# Patient Record
Sex: MALE | Race: WHITE | NOT HISPANIC OR LATINO | Employment: UNEMPLOYED | URBAN - METROPOLITAN AREA
[De-identification: names, ages, dates, MRNs, and addresses within clinical notes are randomized per-mention and may not be internally consistent; named-entity substitution may affect disease eponyms.]

---

## 2020-12-17 ENCOUNTER — HOSPITAL ENCOUNTER (EMERGENCY)
Facility: OTHER | Age: 30
Discharge: HOME | End: 2020-12-17
Attending: EMERGENCY MEDICINE

## 2020-12-17 VITALS
SYSTOLIC BLOOD PRESSURE: 126 MMHG | DIASTOLIC BLOOD PRESSURE: 74 MMHG | HEIGHT: 70 IN | TEMPERATURE: 99 F | BODY MASS INDEX: 24.34 KG/M2 | WEIGHT: 170 LBS | RESPIRATION RATE: 18 BRPM | HEART RATE: 63 BPM | OXYGEN SATURATION: 98 %

## 2020-12-17 DIAGNOSIS — N48.89 PENILE EDEMA: ICD-10-CM

## 2020-12-17 DIAGNOSIS — N30.01 ACUTE CYSTITIS WITH HEMATURIA: Primary | ICD-10-CM

## 2020-12-17 DIAGNOSIS — R10.32 LEFT GROIN PAIN: ICD-10-CM

## 2020-12-17 LAB
ALBUMIN SERPL BCP-MCNC: 4.2 G/DL (ref 3.5–5.2)
ALP SERPL-CCNC: 63 U/L (ref 55–135)
ALT SERPL W/O P-5'-P-CCNC: 20 U/L (ref 10–44)
ANION GAP SERPL CALC-SCNC: 10 MMOL/L (ref 8–16)
AST SERPL-CCNC: 29 U/L (ref 10–40)
BACTERIA #/AREA URNS HPF: ABNORMAL /HPF
BASOPHILS # BLD AUTO: 0.05 K/UL (ref 0–0.2)
BASOPHILS NFR BLD: 0.5 % (ref 0–1.9)
BILIRUB SERPL-MCNC: 0.9 MG/DL (ref 0.1–1)
BILIRUB UR QL STRIP: NEGATIVE
BUN SERPL-MCNC: 11 MG/DL (ref 6–20)
CALCIUM SERPL-MCNC: 9.5 MG/DL (ref 8.7–10.5)
CHLORIDE SERPL-SCNC: 103 MMOL/L (ref 95–110)
CLARITY UR: CLEAR
CO2 SERPL-SCNC: 24 MMOL/L (ref 23–29)
COLOR UR: YELLOW
CREAT SERPL-MCNC: 1.1 MG/DL (ref 0.5–1.4)
DIFFERENTIAL METHOD: ABNORMAL
EOSINOPHIL # BLD AUTO: 0 K/UL (ref 0–0.5)
EOSINOPHIL NFR BLD: 0.3 % (ref 0–8)
ERYTHROCYTE [DISTWIDTH] IN BLOOD BY AUTOMATED COUNT: 11.6 % (ref 11.5–14.5)
EST. GFR  (AFRICAN AMERICAN): >60 ML/MIN/1.73 M^2
EST. GFR  (NON AFRICAN AMERICAN): >60 ML/MIN/1.73 M^2
GLUCOSE SERPL-MCNC: 97 MG/DL (ref 70–110)
GLUCOSE UR QL STRIP: NEGATIVE
HCT VFR BLD AUTO: 40.2 % (ref 40–54)
HCV AB SERPL QL IA: NEGATIVE
HGB BLD-MCNC: 14 G/DL (ref 14–18)
HGB UR QL STRIP: ABNORMAL
HIV 1+2 AB+HIV1 P24 AG SERPL QL IA: NEGATIVE
IMM GRANULOCYTES # BLD AUTO: 0.04 K/UL (ref 0–0.04)
IMM GRANULOCYTES NFR BLD AUTO: 0.4 % (ref 0–0.5)
KETONES UR QL STRIP: NEGATIVE
LEUKOCYTE ESTERASE UR QL STRIP: ABNORMAL
LYMPHOCYTES # BLD AUTO: 1.4 K/UL (ref 1–4.8)
LYMPHOCYTES NFR BLD: 12.6 % (ref 18–48)
MCH RBC QN AUTO: 28.2 PG (ref 27–31)
MCHC RBC AUTO-ENTMCNC: 34.8 G/DL (ref 32–36)
MCV RBC AUTO: 81 FL (ref 82–98)
MICROSCOPIC COMMENT: ABNORMAL
MONOCYTES # BLD AUTO: 0.9 K/UL (ref 0.3–1)
MONOCYTES NFR BLD: 8.5 % (ref 4–15)
NEUTROPHILS # BLD AUTO: 8.4 K/UL (ref 1.8–7.7)
NEUTROPHILS NFR BLD: 77.7 % (ref 38–73)
NITRITE UR QL STRIP: NEGATIVE
NRBC BLD-RTO: 0 /100 WBC
PH UR STRIP: 6 [PH] (ref 5–8)
PLATELET # BLD AUTO: 174 K/UL (ref 150–350)
PMV BLD AUTO: 12 FL (ref 9.2–12.9)
POTASSIUM SERPL-SCNC: 3.9 MMOL/L (ref 3.5–5.1)
PROT SERPL-MCNC: 7.4 G/DL (ref 6–8.4)
PROT UR QL STRIP: NEGATIVE
RBC # BLD AUTO: 4.96 M/UL (ref 4.6–6.2)
RBC #/AREA URNS HPF: 1 /HPF (ref 0–4)
SODIUM SERPL-SCNC: 137 MMOL/L (ref 136–145)
SP GR UR STRIP: 1.01 (ref 1–1.03)
SQUAMOUS #/AREA URNS HPF: 3 /HPF
URN SPEC COLLECT METH UR: ABNORMAL
UROBILINOGEN UR STRIP-ACNC: NEGATIVE EU/DL
WBC # BLD AUTO: 10.78 K/UL (ref 3.9–12.7)
WBC #/AREA URNS HPF: 10 /HPF (ref 0–5)
WBC CLUMPS URNS QL MICRO: ABNORMAL

## 2020-12-17 PROCEDURE — 25000003 PHARM REV CODE 250: Performed by: EMERGENCY MEDICINE

## 2020-12-17 PROCEDURE — 99285 EMERGENCY DEPT VISIT HI MDM: CPT | Mod: 25

## 2020-12-17 PROCEDURE — 86703 HIV-1/HIV-2 1 RESULT ANTBDY: CPT

## 2020-12-17 PROCEDURE — 63600175 PHARM REV CODE 636 W HCPCS: Performed by: EMERGENCY MEDICINE

## 2020-12-17 PROCEDURE — 87086 URINE CULTURE/COLONY COUNT: CPT

## 2020-12-17 PROCEDURE — 96361 HYDRATE IV INFUSION ADD-ON: CPT

## 2020-12-17 PROCEDURE — 80053 COMPREHEN METABOLIC PANEL: CPT

## 2020-12-17 PROCEDURE — 85025 COMPLETE CBC W/AUTO DIFF WBC: CPT

## 2020-12-17 PROCEDURE — 81000 URINALYSIS NONAUTO W/SCOPE: CPT

## 2020-12-17 PROCEDURE — 86803 HEPATITIS C AB TEST: CPT

## 2020-12-17 PROCEDURE — 96374 THER/PROPH/DIAG INJ IV PUSH: CPT

## 2020-12-17 PROCEDURE — 25500020 PHARM REV CODE 255: Performed by: EMERGENCY MEDICINE

## 2020-12-17 RX ORDER — ACETAMINOPHEN 500 MG
1000 TABLET ORAL
Status: COMPLETED | OUTPATIENT
Start: 2020-12-17 | End: 2020-12-17

## 2020-12-17 RX ORDER — OXYCODONE AND ACETAMINOPHEN 5; 325 MG/1; MG/1
1 TABLET ORAL EVERY 6 HOURS PRN
Qty: 10 TABLET | Refills: 0 | Status: SHIPPED | OUTPATIENT
Start: 2020-12-17 | End: 2023-06-08

## 2020-12-17 RX ORDER — SULFAMETHOXAZOLE AND TRIMETHOPRIM 800; 160 MG/1; MG/1
1 TABLET ORAL 2 TIMES DAILY
Qty: 20 TABLET | Refills: 0 | Status: SHIPPED | OUTPATIENT
Start: 2020-12-17 | End: 2020-12-27

## 2020-12-17 RX ORDER — MORPHINE SULFATE 10 MG/ML
2 INJECTION INTRAMUSCULAR; INTRAVENOUS; SUBCUTANEOUS
Status: COMPLETED | OUTPATIENT
Start: 2020-12-17 | End: 2020-12-17

## 2020-12-17 RX ADMIN — SODIUM CHLORIDE 1000 ML: 0.9 INJECTION, SOLUTION INTRAVENOUS at 09:12

## 2020-12-17 RX ADMIN — IOHEXOL 75 ML: 350 INJECTION, SOLUTION INTRAVENOUS at 10:12

## 2020-12-17 RX ADMIN — ACETAMINOPHEN 1000 MG: 500 TABLET, FILM COATED ORAL at 09:12

## 2020-12-17 RX ADMIN — MORPHINE SULFATE 2 MG: 10 INJECTION INTRAVENOUS at 09:12

## 2020-12-18 NOTE — FIRST PROVIDER EVALUATION
" Emergency Department TeleTriage Encounter Note      CHIEF COMPLAINT    Chief Complaint   Patient presents with    Groin Pain     reports left sided groin pain and a "lump" x2 days with penis swelling today. Denies testicle swelling and discharge        VITAL SIGNS   Initial Vitals [12/17/20 1924]   BP Pulse Resp Temp SpO2   135/76 (!) 115 18 100 °F (37.8 °C) 99 %      MAP       --            ALLERGIES    Review of patient's allergies indicates:  No Known Allergies    PROVIDER TRIAGE NOTE  Patient reports swelling, tenderness and redness to the groin area.  He denies any urinary symptoms.  He denies penile discharge.  He is not significantly concerned for sexually transmitted infections.  He denies any testicular pain.      ORDERS  Labs Reviewed   HIV 1 / 2 ANTIBODY   HEPATITIS C ANTIBODY       ED Orders (720h ago, onward)    Start Ordered     Status Ordering Provider    12/17/20 1926 12/17/20 1926  HIV 1/2 Ag/Ab (4th Gen)  STAT  Collect    Ordered WILLY REDDING LMadelin II    12/17/20 1926 12/17/20 1926  Hepatitis C antibody  STAT  Collect    Ordered WILLY REDDING LMadelin II            Virtual Visit Note: The provider triage portion of this emergency department evaluation and documentation was performed via [employer], a HIPAA-compliant telemedicine application, in concert with a tele-presenter in the room. A face to face patient evaluation with one of my colleagues will occur once the patient is placed in an emergency department room.      DISCLAIMER: This note was prepared with [employer]*Bundle voice recognition transcription software. Garbled syntax, mangled pronouns, and other bizarre constructions may be attributed to that software system.  "

## 2020-12-18 NOTE — ED TRIAGE NOTES
"Pt reports to ED with c/o groin swelling x 1 day. Pt states that he had sex with his girlfriend and noticed swelling in his penis as well as a "lump" in L groin. Some swelling noted to head of penis, denies any pain but states that the "lump in his groin" is tender.  Pt denies any urinary s/s.  "

## 2020-12-18 NOTE — ED PROVIDER NOTES
"Encounter Date: 12/17/2020    SCRIBE #1 NOTE: I, Robles Stoddard, am scribing for, and in the presence of, Dr. Powers.       History     Chief Complaint   Patient presents with    Groin Pain     reports left sided groin pain and a "lump" x2 days with penis swelling today. Denies testicle swelling and discharge      Time seen by provider: 9:09 PM    This is a 30 y.o. male who presents with complaint of left sided groin pain that began two days ago. He is also experiencing penile swelling and intermittent fever. The patient is  and sexually active. He last had intercourse on 12/15/2020, but denies any pain with intercourse. He denies testicular pain, testicular swelling, or penile discharge.     The history is provided by the patient.     Review of patient's allergies indicates:  No Known Allergies  History reviewed. No pertinent past medical history.  Past Surgical History:   Procedure Laterality Date    KNEE SURGERY       History reviewed. No pertinent family history.  Social History     Tobacco Use    Smoking status: Former Smoker     Types: Vaping w/o nicotine    Smokeless tobacco: Never Used    Tobacco comment: occ   Substance Use Topics    Alcohol use: No    Drug use: Not Currently     Review of Systems   Constitutional: Negative for chills and fever.   HENT: Negative for congestion and sore throat.    Eyes: Negative for photophobia and redness.   Respiratory: Negative for cough and shortness of breath.    Cardiovascular: Negative for chest pain.   Gastrointestinal: Negative for abdominal pain, nausea and vomiting.   Genitourinary: Positive for penile swelling. Negative for dysuria.        Positive for left sided groin pain.   Musculoskeletal: Negative for back pain.   Skin: Negative for rash.   Neurological: Negative for weakness, light-headedness and headaches.   Psychiatric/Behavioral: Negative for confusion.       Physical Exam     Initial Vitals [12/17/20 1924]   BP Pulse Resp Temp SpO2 "   135/76 (!) 115 18 100 °F (37.8 °C) 99 %      MAP       --         Physical Exam    Nursing note and vitals reviewed.  Constitutional: He appears well-developed and well-nourished. He is not diaphoretic. No distress.   HENT:   Head: Normocephalic and atraumatic.   Mouth/Throat: Oropharynx is clear and moist.   Moist mucus membranes. TMs clear and intact bilaterally.    Eyes: Conjunctivae and EOM are normal. Pupils are equal, round, and reactive to light.   Conjunctivae pink, clear, and intact.    Neck: Normal range of motion. Neck supple.   No cervical lymphadenopathy.    Cardiovascular: Normal rate, regular rhythm, S1 normal, S2 normal and normal heart sounds. Exam reveals no gallop and no friction rub.    No murmur heard.  Pulmonary/Chest: Breath sounds normal. No respiratory distress. He has no wheezes. He has no rhonchi. He has no rales.   Lungs clear to auscultation bilaterally.    Abdominal: Soft. Bowel sounds are normal. There is no abdominal tenderness. There is no rebound and no guarding.   No audible bruits.    Genitourinary: Circumcised.    Genitourinary Comments: Penis is edematous. No abscess. No scrotal tenderness. No perineal pain, tenderness, or abscess. No palpable hernia. Tenderness to the left groin area with no mass or lymphadenopathy.     Musculoskeletal: Normal range of motion. No tenderness or edema.      Comments: No lower extremity edema.    Lymphadenopathy:     He has no cervical adenopathy.   Neurological: He is alert and oriented to person, place, and time.   Skin: Skin is warm and dry. Capillary refill takes less than 2 seconds. No rash noted. No pallor.   Warm and dry. No skin tenting, rashes, or lesions.     Psychiatric: He has a normal mood and affect. His behavior is normal. Judgment and thought content normal.         ED Course   Procedures  Labs Reviewed   URINALYSIS, REFLEX TO URINE CULTURE - Abnormal; Notable for the following components:       Result Value    Occult Blood UA 1+  (*)     Leukocytes, UA Trace (*)     All other components within normal limits    Narrative:     Specimen Source->Urine   URINALYSIS MICROSCOPIC - Abnormal; Notable for the following components:    WBC, UA 10 (*)     WBC Clumps, UA Few (*)     All other components within normal limits    Narrative:     Specimen Source->Urine   CBC W/ AUTO DIFFERENTIAL - Abnormal; Notable for the following components:    MCV 81 (*)     Gran # (ANC) 8.4 (*)     Gran % 77.7 (*)     Lymph % 12.6 (*)     All other components within normal limits   CULTURE, URINE   CULTURE, URINE   HIV 1 / 2 ANTIBODY   HEPATITIS C ANTIBODY   COMPREHENSIVE METABOLIC PANEL          Imaging Results          CT Abdomen Pelvis With Contrast / no oral contrast (Final result)  Result time 12/17/20 22:43:46    Final result by Jose Manrique MD (12/17/20 22:43:46)                 Impression:      1. Nonspecific penile soft tissue swelling and edema, correlating with reported clinical history.  No rim enhancing abscess seen.  2. Prominent soft tissue attenuation within the right mid abdomen which closely adheres along the inferior hepatic contour.  Appearance is atypical but may relate to conglomeration of nondistended and non-opacified small bowel loops with possible congenital non-rotation.  Future CT follow-up with oral contrast versus upper GI/small bowel follow-through may be obtained if clinically indicated.  3. Otherwise no acute intra-abdominal abnormalities identified.  4. Mild hepatosplenomegaly.      Electronically signed by: Jose Manrique MD  Date:    12/17/2020  Time:    22:43             Narrative:    EXAMINATION:  CT ABDOMEN PELVIS WITH CONTRAST    CLINICAL HISTORY:  Groin pain with fever and penile edema, circumcised;    TECHNIQUE:  Low dose axial images, sagittal and coronal reformations were obtained from the lung bases to the pubic symphysis following the IV administration of 75 mL of Omnipaque 350 .  Oral contrast was not  given.    COMPARISON:  None.    FINDINGS:  The visualized portion of the heart is unremarkable.  The lung bases are clear.    No significant hepatic abnormalities are identified.  Liver is enlarged measuring 20 cm.  Spleen is mildly enlarged measuring 13 cm.  There is no intra-or extrahepatic biliary ductal dilatation.  The gallbladder is unremarkable.  The stomach, pancreas, spleen, and adrenal glands are unremarkable.    Kidneys enhance normally with no evidence of hydronephrosis.  No definite abnormalities are seen along the ureteral courses.  Urinary bladder and prostate are unremarkable.    Appendix is not definitely visualized.  There is nonspecific prominent soft tissue attenuation seen in the right mid abdomen which is felt most likely to represent conglomeration of nondistended and non-opacified right-sided small bowel loops with possible congenital non-rotation.  The adheres closely along the inferior hepatic contour.  No evidence of bowel obstruction.  No free air or free fluid.    Aorta tapers normally.    No acute osseous abnormality identified.    There is mild penile soft tissue swelling and edema.  No focal fluid collections or rim enhancing abscess seen                                 Medical Decision Making:   History:   Old Medical Records: I decided to obtain old medical records.  Clinical Tests:   Lab Tests: Ordered and Reviewed            Scribe Attestation:   Scribe #1: I performed the above scribed service and the documentation accurately describes the services I performed. I attest to the accuracy of the note.    Attending Attestation:           Physician Attestation for Scribe:  Physician Attestation Statement for Scribe #1: I, Dr. Powers, reviewed documentation, as scribed by Robles Stoddard in my presence, and it is both accurate and complete.         Attending ED Notes:   Emergent evaluation a 30-year-old female with complaint of painless penile edema and left-sided nontraumatic groin  pain.  Patient is  and sexually active with 1 partner.  Patient is mildly febrile, nontoxic-appearing stable vital signs.  Patient has no elevation white blood cell count.  H&H is 14 and 40.2.  No acute findings on CMP.  On examination femoral pulses 2+.  No masses or lymphadenopathy.  No hernia appreciated.  No Ana's gangrene.  No testicular pain.  Urinary analysis reveals blood, leukocytes with bacteria and white blood cell clumps.  No acute findings on CT scan except for nonspecific penile soft tissue swelling and edema.  Patient has mild hepatic splenomegaly.  I consulted and discussed patient with Urology on-call, Dr. Loaiza.  No acute intervention at this time.  The patient is extensively counseled on his diagnosis and treatment including all diagnostic, laboratory and physical exam findings.  The patient discharged good condition and directed follow-up with Urology this coming Monday.                    Clinical Impression:     ICD-10-CM ICD-9-CM   1. Acute cystitis with hematuria  N30.01 595.0   2. Left groin pain  R10.32 789.04   3. Penile edema  N48.89 607.83                          ED Disposition Condition    Discharge Good        ED Prescriptions     Medication Sig Dispense Start Date End Date Auth. Provider    sulfamethoxazole-trimethoprim 800-160mg (BACTRIM DS) 800-160 mg Tab Take 1 tablet by mouth 2 (two) times daily. for 10 days 20 tablet 12/17/2020 12/27/2020 Yony Man MD    oxyCODONE-acetaminophen (PERCOCET) 5-325 mg per tablet Take 1 tablet by mouth every 6 (six) hours as needed for Pain. 10 tablet 12/17/2020  Yoyn Man MD        Follow-up Information     Follow up With Specialties Details Why Contact Info Additional Information    [facility], [place], operated by [facility] Urology In 2 days  [address], [address]  [place] [address] [phone] Urology - [facility], 6th Floor Please park in the [address] and use [place] elevators                                        Yony Man MD  12/18/20 0759

## 2020-12-19 LAB — BACTERIA UR CULT: NO GROWTH

## 2022-08-07 ENCOUNTER — NURSE TRIAGE (OUTPATIENT)
Dept: ADMINISTRATIVE | Facility: CLINIC | Age: 32
End: 2022-08-07

## 2022-08-08 NOTE — TELEPHONE ENCOUNTER
Reason for Disposition   Symptoms of FB stuck in esophagus (e.g., continued pain in throat or chest, FB sensation, gagging)    Additional Information   Negative: [1] Choking or struggling to breathe now AND [2] lasts > 60 seconds   Negative: Can't cough, speak, or make any noise now (i.e., stops breathing)   Negative: Has passed out or is limp.   Negative: Bluish (or gray) lips or face now   Negative: Sounds like a life-threatening emergency to the triager   Negative: [1] Recovered from choking AND [2] may have swallowed a FB (foreign body)   Negative: [1] Patient cleared the FB spontaneously BUT [2] continues to have coughing, hoarseness, or wheezing > 30 minutes   Negative: Coughed up blood  (Exception:  blood-streaked sputum and once only)   Negative: [1] Patient cleared the FB spontaneously BUT [2] difficulty swallowing or gagging persist   Negative: Shortness of breath after choking spell    Protocols used: CHOKING - INHALED FOREIGN BODY-A-AH  spouse called re pt with food stuck in airway. Pt feels piece of rice in his airway. no trouble breathing. rec ED. Call back with questions

## 2022-11-02 ENCOUNTER — HOSPITAL ENCOUNTER (EMERGENCY)
Facility: OTHER | Age: 32
Discharge: HOME | End: 2022-11-02
Attending: EMERGENCY MEDICINE
Payer: MEDICAID

## 2022-11-02 VITALS
HEART RATE: 70 BPM | DIASTOLIC BLOOD PRESSURE: 57 MMHG | RESPIRATION RATE: 17 BRPM | WEIGHT: 175 LBS | OXYGEN SATURATION: 98 % | HEIGHT: 70 IN | SYSTOLIC BLOOD PRESSURE: 104 MMHG | BODY MASS INDEX: 25.05 KG/M2 | TEMPERATURE: 99 F

## 2022-11-02 DIAGNOSIS — R10.30 INGUINAL PAIN, UNSPECIFIED LATERALITY: ICD-10-CM

## 2022-11-02 DIAGNOSIS — Q43.3 MALROTATION COLON: ICD-10-CM

## 2022-11-02 DIAGNOSIS — R50.9 FEVER, UNSPECIFIED FEVER CAUSE: Primary | ICD-10-CM

## 2022-11-02 LAB
ALBUMIN SERPL BCP-MCNC: 4.2 G/DL (ref 3.5–5.2)
ALP SERPL-CCNC: 83 U/L (ref 55–135)
ALT SERPL W/O P-5'-P-CCNC: 15 U/L (ref 10–44)
ANION GAP SERPL CALC-SCNC: 9 MMOL/L (ref 8–16)
AST SERPL-CCNC: 18 U/L (ref 10–40)
BASOPHILS # BLD AUTO: 0.04 K/UL (ref 0–0.2)
BASOPHILS NFR BLD: 0.2 % (ref 0–1.9)
BILIRUB SERPL-MCNC: 0.5 MG/DL (ref 0.1–1)
BILIRUB UR QL STRIP: NEGATIVE
BUN SERPL-MCNC: 13 MG/DL (ref 6–20)
CALCIUM SERPL-MCNC: 9.3 MG/DL (ref 8.7–10.5)
CHLORIDE SERPL-SCNC: 106 MMOL/L (ref 95–110)
CLARITY UR: CLEAR
CO2 SERPL-SCNC: 27 MMOL/L (ref 23–29)
COLOR UR: YELLOW
CREAT SERPL-MCNC: 0.9 MG/DL (ref 0.5–1.4)
CTP QC/QA: YES
CTP QC/QA: YES
DIFFERENTIAL METHOD: ABNORMAL
EOSINOPHIL # BLD AUTO: 0.2 K/UL (ref 0–0.5)
EOSINOPHIL NFR BLD: 1 % (ref 0–8)
ERYTHROCYTE [DISTWIDTH] IN BLOOD BY AUTOMATED COUNT: 11.7 % (ref 11.5–14.5)
EST. GFR  (NO RACE VARIABLE): >60 ML/MIN/1.73 M^2
GLUCOSE SERPL-MCNC: 118 MG/DL (ref 70–110)
GLUCOSE UR QL STRIP: NEGATIVE
HCT VFR BLD AUTO: 41.9 % (ref 40–54)
HCV AB SERPL QL IA: NEGATIVE
HGB BLD-MCNC: 14.3 G/DL (ref 14–18)
HGB UR QL STRIP: NEGATIVE
HIV 1+2 AB+HIV1 P24 AG SERPL QL IA: NEGATIVE
IMM GRANULOCYTES # BLD AUTO: 0.05 K/UL (ref 0–0.04)
IMM GRANULOCYTES NFR BLD AUTO: 0.3 % (ref 0–0.5)
KETONES UR QL STRIP: NEGATIVE
LEUKOCYTE ESTERASE UR QL STRIP: NEGATIVE
LYMPHOCYTES # BLD AUTO: 0.8 K/UL (ref 1–4.8)
LYMPHOCYTES NFR BLD: 4.7 % (ref 18–48)
MCH RBC QN AUTO: 27.8 PG (ref 27–31)
MCHC RBC AUTO-ENTMCNC: 34.1 G/DL (ref 32–36)
MCV RBC AUTO: 82 FL (ref 82–98)
MONOCYTES # BLD AUTO: 0.6 K/UL (ref 0.3–1)
MONOCYTES NFR BLD: 3.3 % (ref 4–15)
NEUTROPHILS # BLD AUTO: 15.7 K/UL (ref 1.8–7.7)
NEUTROPHILS NFR BLD: 90.5 % (ref 38–73)
NITRITE UR QL STRIP: NEGATIVE
NRBC BLD-RTO: 0 /100 WBC
PH UR STRIP: 6 [PH] (ref 5–8)
PLATELET # BLD AUTO: 249 K/UL (ref 150–450)
PMV BLD AUTO: 10.9 FL (ref 9.2–12.9)
POC MOLECULAR INFLUENZA A AGN: NEGATIVE
POC MOLECULAR INFLUENZA B AGN: NEGATIVE
POTASSIUM SERPL-SCNC: 4.1 MMOL/L (ref 3.5–5.1)
PROT SERPL-MCNC: 7.4 G/DL (ref 6–8.4)
PROT UR QL STRIP: NEGATIVE
RBC # BLD AUTO: 5.14 M/UL (ref 4.6–6.2)
SARS-COV-2 RDRP RESP QL NAA+PROBE: NEGATIVE
SODIUM SERPL-SCNC: 142 MMOL/L (ref 136–145)
SP GR UR STRIP: 1.02 (ref 1–1.03)
URN SPEC COLLECT METH UR: NORMAL
UROBILINOGEN UR STRIP-ACNC: 1 EU/DL
WBC # BLD AUTO: 17.32 K/UL (ref 3.9–12.7)

## 2022-11-02 PROCEDURE — 85025 COMPLETE CBC W/AUTO DIFF WBC: CPT | Performed by: EMERGENCY MEDICINE

## 2022-11-02 PROCEDURE — 25000003 PHARM REV CODE 250: Performed by: EMERGENCY MEDICINE

## 2022-11-02 PROCEDURE — 86803 HEPATITIS C AB TEST: CPT | Performed by: EMERGENCY MEDICINE

## 2022-11-02 PROCEDURE — 96365 THER/PROPH/DIAG IV INF INIT: CPT

## 2022-11-02 PROCEDURE — 96367 TX/PROPH/DG ADDL SEQ IV INF: CPT

## 2022-11-02 PROCEDURE — 87040 BLOOD CULTURE FOR BACTERIA: CPT | Performed by: EMERGENCY MEDICINE

## 2022-11-02 PROCEDURE — 81003 URINALYSIS AUTO W/O SCOPE: CPT | Performed by: EMERGENCY MEDICINE

## 2022-11-02 PROCEDURE — 87635 SARS-COV-2 COVID-19 AMP PRB: CPT | Performed by: EMERGENCY MEDICINE

## 2022-11-02 PROCEDURE — 99285 EMERGENCY DEPT VISIT HI MDM: CPT | Mod: 25

## 2022-11-02 PROCEDURE — 63600175 PHARM REV CODE 636 W HCPCS: Performed by: EMERGENCY MEDICINE

## 2022-11-02 PROCEDURE — 96375 TX/PRO/DX INJ NEW DRUG ADDON: CPT

## 2022-11-02 PROCEDURE — 87389 HIV-1 AG W/HIV-1&-2 AB AG IA: CPT | Performed by: EMERGENCY MEDICINE

## 2022-11-02 PROCEDURE — 25500020 PHARM REV CODE 255: Performed by: EMERGENCY MEDICINE

## 2022-11-02 PROCEDURE — 87491 CHLMYD TRACH DNA AMP PROBE: CPT | Performed by: EMERGENCY MEDICINE

## 2022-11-02 PROCEDURE — 80053 COMPREHEN METABOLIC PANEL: CPT | Performed by: EMERGENCY MEDICINE

## 2022-11-02 PROCEDURE — 87591 N.GONORRHOEAE DNA AMP PROB: CPT | Performed by: EMERGENCY MEDICINE

## 2022-11-02 RX ORDER — NAPROXEN 500 MG/1
500 TABLET ORAL 2 TIMES DAILY PRN
Qty: 60 TABLET | Refills: 0 | Status: SHIPPED | OUTPATIENT
Start: 2022-11-02 | End: 2023-06-08

## 2022-11-02 RX ORDER — SULFAMETHOXAZOLE AND TRIMETHOPRIM 800; 160 MG/1; MG/1
1 TABLET ORAL 2 TIMES DAILY
Qty: 56 TABLET | Refills: 0 | Status: SHIPPED | OUTPATIENT
Start: 2022-11-02 | End: 2022-11-30

## 2022-11-02 RX ORDER — ACETAMINOPHEN 500 MG
1000 TABLET ORAL
Status: COMPLETED | OUTPATIENT
Start: 2022-11-02 | End: 2022-11-02

## 2022-11-02 RX ORDER — ACETAMINOPHEN 325 MG/1
650 TABLET ORAL EVERY 6 HOURS PRN
Qty: 30 TABLET | Refills: 0 | Status: SHIPPED | OUTPATIENT
Start: 2022-11-02 | End: 2023-06-08

## 2022-11-02 RX ORDER — HYDROMORPHONE HYDROCHLORIDE 1 MG/ML
0.5 INJECTION, SOLUTION INTRAMUSCULAR; INTRAVENOUS; SUBCUTANEOUS EVERY 30 MIN PRN
Status: DISCONTINUED | OUTPATIENT
Start: 2022-11-02 | End: 2022-11-02 | Stop reason: HOSPADM

## 2022-11-02 RX ORDER — SULFAMETHOXAZOLE AND TRIMETHOPRIM 800; 160 MG/1; MG/1
1 TABLET ORAL
Status: COMPLETED | OUTPATIENT
Start: 2022-11-02 | End: 2022-11-02

## 2022-11-02 RX ORDER — KETOROLAC TROMETHAMINE 30 MG/ML
10 INJECTION, SOLUTION INTRAMUSCULAR; INTRAVENOUS
Status: COMPLETED | OUTPATIENT
Start: 2022-11-02 | End: 2022-11-02

## 2022-11-02 RX ADMIN — CEFTRIAXONE 1 G: 1 INJECTION, SOLUTION INTRAVENOUS at 08:11

## 2022-11-02 RX ADMIN — IOHEXOL 75 ML: 350 INJECTION, SOLUTION INTRAVENOUS at 06:11

## 2022-11-02 RX ADMIN — SULFAMETHOXAZOLE AND TRIMETHOPRIM 1 TABLET: 800; 160 TABLET ORAL at 08:11

## 2022-11-02 RX ADMIN — HYDROMORPHONE HYDROCHLORIDE 0.5 MG: 1 INJECTION, SOLUTION INTRAMUSCULAR; INTRAVENOUS; SUBCUTANEOUS at 07:11

## 2022-11-02 RX ADMIN — PIPERACILLIN AND TAZOBACTAM 4.5 G: 4; .5 INJECTION, POWDER, LYOPHILIZED, FOR SOLUTION INTRAVENOUS; PARENTERAL at 06:11

## 2022-11-02 RX ADMIN — ACETAMINOPHEN 1000 MG: 500 TABLET ORAL at 04:11

## 2022-11-02 RX ADMIN — KETOROLAC TROMETHAMINE 10 MG: 30 INJECTION, SOLUTION INTRAMUSCULAR; INTRAVENOUS at 07:11

## 2022-11-02 NOTE — ED TRIAGE NOTES
Pt presents to ED c/o groin pain and swelling and fever onset today. Pt states he has been seen by urologist for same s/s in the past with no diagnosis. Denies urinary symptoms, N/V/D, any medical hx.

## 2022-11-02 NOTE — ED PROVIDER NOTES
"  Source of History:  Medical record, patient, patient's wife    Chief complaint:  Per triage note: "Groin Pain (Pt presents to the ER with complaints of groin pain and fever that started today. Pt reports similar complaints in the past for which he has seen a Urologist with no diagnosis./)  "    HPI:    Patient presents with complaint of groin pain and fever that began this morning. He also reports some chills and diaphoresis. He denies any dysuria, hematuria, abdominal pain, penile swelling, back pain, nausea,or vomiting. Patient has a history of five previous episode of this pain and has seen a urologist, with no diagnosis. He notes that he typically develop penile swelling with this issue. He states he is typically prescribed antibiotics for one week and the pain recurs a few months later. Patient has not been vaccinated for COVID-19 or the flu.   When discussing in private, pt states he is sexually active, admits to a recent sexual encounter of unprotected oral intercourse with a female partner who is not his wife.   This is the extent of the patient's complaints at this time.     ROS:   As per HPI and below:   General: Notes fever. Notes chills. Notes diaphoresis.  HENT: No facial pain.   Eyes: No eye pain.   Cardiovascular: No chest pain.   Respiratory:  No dyspnea.   GI: No abdominal pain. No nausea. No vomiting. No diarrhea.   : Notes groin pain. No penile swelling. No dysuria or hematuria.   Skin: No rashes.   Neuro:  No syncope.  No focal deficits.   Musculoskeletal: No extremity pain. No back pain.  All other systems reviewed and are negative.      Review of patient's allergies indicates:  No Known Allergies    PMH:  As per HPI and below:  No past medical history on file.    Past Surgical History:   Procedure Laterality Date    KNEE SURGERY         Social History     Tobacco Use    Smoking status: Former     Types: Vaping w/o nicotine    Smokeless tobacco: Never    Tobacco comments:     occ   Substance " "Use Topics    Alcohol use: No    Drug use: Not Currently       Physical Exam:      Nursing note and vitals reviewed.  BP (!) 102/50   Pulse 68   Temp 99 °F (37.2 °C) (Oral)   Resp 16   Ht 5' 10" (1.778 m)   Wt 79.4 kg (175 lb)   SpO2 98%   BMI 25.11 kg/m²       Constitutional: AAOx3. No distress. Wife at bedside.  Eyes: EOMI. No discharge. Anicteric.  HENT:   Neck: Normal range of motion. Neck supple.  Cardiovascular: Normal rate. No murmur, no gallop and no friction rub heard.   Pulmonary/Chest: No respiratory distress. Effort normal. No wheezes, no rales, no rhonchi.   Abdominal: Bowel sounds normal. Soft. No distension and no mass. There is no tenderness. There is no rebound, no guarding, no tenderness at McBurney's point.  Musculoskeletal: Normal range of motion. No CVA tenderness.  Neurological: GCS 15. Alert and oriented to person, place, and time. No gross cranial nerve, light touch or strength deficit. Coordination normal.   Skin: Skin is warm and dry.   EXT: 2+ radial pulses.   Psychiatric: Behavior is normal. Judgment normal.  Genitourinary: Penis normal. Cremasteric reflex is present. Right testis shows no mass, no swelling and no tenderness. Left testis shows no mass, no swelling and no tenderness. No phimosis, paraphimosis or penile erythema. No discharge found.  Exam chaperoned by tech or RN.     MDM:    I decided to obtain the patient's medical records.    ED Course as of 11/02/22 2117 Wed Nov 02, 2022 2009 Patient is a 32-year-old male with no reported past medical history apart from recurrent episodes have fever, groin pain that respond to short courses of Abx (denies any prolonged   I independently reviewed and interpreted CT abdomen/pelvis, notable for no free air, fluid collection, or evidence of obstructive process noting congenital nonrotation of bowel.  [RC]   2014 Patient history, findings, results discussed with radiologist Dr. Khan. He feels prostate is "WNL and normal size" " noting early or mild prostatitis may have no apparent radiologic changes.  [RC]   2033 Patient history, findings, results discussed with on-call urologist Dr. Verdugo who agrees with treatment plan for extended course outpt bactrim for empiric treatment of possible early or recurrent prostatitis, outpt urology f/u.  [RC]   2057 Patient defervesced.  His tachycardia is resolved.  He reports significant symptomatic improvement, and states he feels comfortable with discharge home.  I had a shared decision making conversation with the patient. The patient displays normal decision making capacity. I explained to the patient the nature of his/her illness, injury, or disease, and the risks and benefits of further observation versus discharge. After a detailed discussion of these, pt states a preference for discharge. All questions answered. I feel this decision is a reasonable balance of risks and benefits. The patient was encouraged to return at any point for continued, new, or concerning symptoms.   Pt is to f/u with GI given his malrotation findings. I discussed the possibility of autoimmune or other more obscure etiology if he continues to have recurrent similar febrile symptoms without clear infectious cause, particularly if groin pain is absent.  [RC]      ED Course User Index  [RC] Yao Otoole MD       Medications   piperacillin-tazobactam 4.5 g in dextrose 5 % 100 mL IVPB (ready to mix system) (0 g Intravenous Stopped 11/2/22 1915)   HYDROmorphone injection 0.5 mg (0.5 mg Intravenous Given 11/2/22 1943)   cefTRIAXone (ROCEPHIN) 1 g/50 mL D5W IVPB (has no administration in time range)   sulfamethoxazole-trimethoprim 800-160mg per tablet 1 tablet (has no administration in time range)   acetaminophen tablet 1,000 mg (1,000 mg Oral Given 11/2/22 1631)   iohexoL (OMNIPAQUE 350) injection 75 mL (75 mLs Intravenous Given 11/2/22 1858)   ketorolac injection 9.999 mg (9.999 mg Intravenous Given 11/2/22 1942)               I, Elvie Bacon, scribed for, and in the presence of, Dr. Otoole. I performed the scribed service and the documentation accurately describes the services I performed. I attest to the accuracy of the note.     Physician Attestation for Scribe:   I, Yao Otoole MD, reviewed documentation as scribed in my presence, which is both accurate and complete.    Diagnostic Impression:    1. Fever, unspecified fever cause    2. Malrotation colon    3. Inguinal pain, unspecified laterality                  Yao Otoole MD  11/02/22 0608

## 2022-11-03 LAB
C TRACH DNA SPEC QL NAA+PROBE: NOT DETECTED
N GONORRHOEA DNA SPEC QL NAA+PROBE: NOT DETECTED

## 2022-11-03 NOTE — DISCHARGE INSTRUCTIONS
Thank you for letting us take care of you today! It was nice meeting you, and I hope you feel better soon.     Call your primary care doctor to make the first available appointment.     Keep all your medical appointments.     Take your regular medication as prescribed. Contact your primary care provider before running out of medication, or for any problems obtaining them.    Do not drive or operate heavy machinery while taking opioid or muscle relaxing medications. Examples include norco, percocet, xanax, valium, flexeril.     Overuse or long term use of pain and sedating medication may lead to addiction, dependence, organ failure, family and work problems, legal problems, accidental overdose and death.    If you do not have health insurance, you probably can afford it:  Call 1-431.754.6962 ([facility] hotline) or go to www.[employer].[place].gov    Your evaluation in the ED does not suggest any emergent or life threatening medical condition requiring admission or immediate intervention beyond that provided in the ED.   However, the signs of a serious problem sometimes take more time to appear.     Do not hesitate to return to the ER if any of the following occur:    Weakness, dizziness, fainting, or loss of consciousness   Fever of 100.4ºF (38ºC) or higher  Any worse symptoms  Any new or concerning symptoms        To protect yourself and others from COVID19:  Get vaccinated.   Anyone over 5 years old is eligible for vaccination.   Everyone 18 and older should get 3 total vaccine doses. Anyone over 50 years old or with certain chronic conditions should get a 4th dose.   Vaccination is shown to prevent getting sick, ending up in the hospital, or dying because of COVID19.   If you are vaccinated, help friends and family get the vaccine.    If not vaccinated:  Your shot is waiting for you. To get it:   Text your ZIP code to GETVAX ([id number]) or VACUNA ([id number]) in [place]e  call 311, or [phone], or [phone], or [phone],    go to www.vaccines.gov, or  Call your health provider  If exposed to someone with cold, flu, or COVID19 symptoms, you must quarantine for at least 5 days.   Even if you have no symptoms   Otherwise you could give the virus to someone who dies from it  Some symptoms of COVID19 include fever, cough, sore throat, breathing troubles, loss of taste/smell, headaches, stomach upset, diarrhea.

## 2022-11-08 LAB
BACTERIA BLD CULT: NORMAL
BACTERIA BLD CULT: NORMAL

## 2023-06-07 ENCOUNTER — TELEPHONE (OUTPATIENT)
Dept: ORTHOPEDICS | Facility: CLINIC | Age: 33
End: 2023-06-07
Payer: COMMERCIAL

## 2023-06-07 DIAGNOSIS — M51.36 DDD (DEGENERATIVE DISC DISEASE), LUMBAR: Primary | ICD-10-CM

## 2023-06-08 ENCOUNTER — TELEPHONE (OUTPATIENT)
Dept: ORTHOPEDICS | Facility: CLINIC | Age: 33
End: 2023-06-08
Payer: MEDICAID

## 2023-06-08 ENCOUNTER — HOSPITAL ENCOUNTER (OUTPATIENT)
Dept: RADIOLOGY | Facility: HOSPITAL | Age: 33
Discharge: HOME | End: 2023-06-08
Attending: ORTHOPAEDIC SURGERY
Payer: MEDICAID

## 2023-06-08 ENCOUNTER — OFFICE VISIT (OUTPATIENT)
Dept: ORTHOPEDICS | Facility: CLINIC | Age: 33
End: 2023-06-08
Payer: MEDICAID

## 2023-06-08 VITALS — BODY MASS INDEX: 26.51 KG/M2 | HEIGHT: 70 IN | WEIGHT: 185.19 LBS

## 2023-06-08 DIAGNOSIS — M51.36 DDD (DEGENERATIVE DISC DISEASE), LUMBAR: ICD-10-CM

## 2023-06-08 DIAGNOSIS — M51.36 DDD (DEGENERATIVE DISC DISEASE), LUMBAR: Primary | ICD-10-CM

## 2023-06-08 DIAGNOSIS — S32.020D CLOSED COMPRESSION FRACTURE OF L2 LUMBAR VERTEBRA WITH ROUTINE HEALING, SUBSEQUENT ENCOUNTER: Primary | ICD-10-CM

## 2023-06-08 PROCEDURE — 72120 X-RAY BEND ONLY L-S SPINE: CPT | Mod: TC

## 2023-06-08 PROCEDURE — 1160F RVW MEDS BY RX/DR IN RCRD: CPT | Mod: CPTII,,, | Performed by: ORTHOPAEDIC SURGERY

## 2023-06-08 PROCEDURE — 99204 OFFICE O/P NEW MOD 45 MIN: CPT | Mod: S$PBB,,, | Performed by: ORTHOPAEDIC SURGERY

## 2023-06-08 PROCEDURE — 99213 OFFICE O/P EST LOW 20 MIN: CPT | Mod: PBBFAC | Performed by: ORTHOPAEDIC SURGERY

## 2023-06-08 PROCEDURE — 99204 PR OFFICE/OUTPT VISIT, NEW, LEVL IV, 45-59 MIN: ICD-10-PCS | Mod: S$PBB,,, | Performed by: ORTHOPAEDIC SURGERY

## 2023-06-08 PROCEDURE — 72100 XR LUMBAR SPINE AP AND LATERAL: ICD-10-PCS | Mod: 26,,, | Performed by: RADIOLOGY

## 2023-06-08 PROCEDURE — 3008F BODY MASS INDEX DOCD: CPT | Mod: CPTII,,, | Performed by: ORTHOPAEDIC SURGERY

## 2023-06-08 PROCEDURE — 72100 X-RAY EXAM L-S SPINE 2/3 VWS: CPT | Mod: 26,,, | Performed by: RADIOLOGY

## 2023-06-08 PROCEDURE — 3008F PR BODY MASS INDEX (BMI) DOCUMENTED: ICD-10-PCS | Mod: CPTII,,, | Performed by: ORTHOPAEDIC SURGERY

## 2023-06-08 PROCEDURE — 72100 X-RAY EXAM L-S SPINE 2/3 VWS: CPT | Mod: TC

## 2023-06-08 PROCEDURE — 72120 XR LUMBAR SPINE FLEXION AND EXTENSION ONLY: ICD-10-PCS | Mod: 26,,, | Performed by: RADIOLOGY

## 2023-06-08 PROCEDURE — 1159F PR MEDICATION LIST DOCUMENTED IN MEDICAL RECORD: ICD-10-PCS | Mod: CPTII,,, | Performed by: ORTHOPAEDIC SURGERY

## 2023-06-08 PROCEDURE — 99999 PR PBB SHADOW E&M-EST. PATIENT-LVL III: CPT | Mod: PBBFAC,,, | Performed by: ORTHOPAEDIC SURGERY

## 2023-06-08 PROCEDURE — 1160F PR REVIEW ALL MEDS BY PRESCRIBER/CLIN PHARMACIST DOCUMENTED: ICD-10-PCS | Mod: CPTII,,, | Performed by: ORTHOPAEDIC SURGERY

## 2023-06-08 PROCEDURE — 99999 PR PBB SHADOW E&M-EST. PATIENT-LVL III: ICD-10-PCS | Mod: PBBFAC,,, | Performed by: ORTHOPAEDIC SURGERY

## 2023-06-08 PROCEDURE — 72120 X-RAY BEND ONLY L-S SPINE: CPT | Mod: 26,,, | Performed by: RADIOLOGY

## 2023-06-08 PROCEDURE — 1159F MED LIST DOCD IN RCRD: CPT | Mod: CPTII,,, | Performed by: ORTHOPAEDIC SURGERY

## 2023-06-08 RX ORDER — CELECOXIB 200 MG/1
200 CAPSULE ORAL DAILY
Qty: 60 CAPSULE | Refills: 1 | Status: SHIPPED | OUTPATIENT
Start: 2023-06-08

## 2023-06-08 NOTE — TELEPHONE ENCOUNTER
----- Message from Catalina Thomas sent at 6/8/2023 11:29 AM CDT -----  Regarding: Medication  Contact: Pt [phone]  Pt is calling asking can provider write a prescription for pt's back states he is going on a trip and will have a long flight and he don't want his back to start hurting please call       [id number] [address] [facility] [address]  [address] [place] [address]  Phone: [phone] Fax: [phone]

## 2023-06-08 NOTE — PROGRESS NOTES
DATE: 6/8/2023  PATIENT: Priti Ernandez    Attending Physician: Yoan Lugo M.D.    CHIEF COMPLAINT: LBP    HISTORY:  Priti Ernandez is a 33 y.o. male presents for initial evaluation of low back pain (Back - 3). The pain has been present since 3/18/23 after falling from a water slide in [facility]. He went to hospital in [facility] and was recommended to have surgery. He refused and treated it with a brace. The patient describes the pain as dull but it does not go down legs.  The pain is worse with activity and improved by rest. There is no associated numbness and tingling. There is no subjective weakness. Prior treatments have included OTC meds, but no PT, URSULA or surgery.    The Patient denies myelopathic symptoms such as handwriting changes or difficulty with buttons/coins/keys. Denies perineal paresthesias, bowel/bladder dysfunction.    The patient vapes for 2 years; he does not have DM or endorse IVDU. The patient is not on any blood thinners and does not take chronic narcotics. He works on retail (selling clothes).    PAST MEDICAL/SURGICAL HISTORY:  History reviewed. No pertinent past medical history.  Past Surgical History:   Procedure Laterality Date    KNEE SURGERY         Current Medications: No current outpatient medications on file.    Social History:   Social History     Socioeconomic History    Marital status: Single   Tobacco Use    Smoking status: Former     Types: Vaping w/o nicotine    Smokeless tobacco: Never    Tobacco comments:     occ   Substance and Sexual Activity    Alcohol use: No    Drug use: Not Currently       REVIEW OF SYSTEMS:  Constitution: Negative. Negative for chills, fever and night sweats.   Cardiovascular: Negative for chest pain and syncope.   Respiratory: Negative for cough and shortness of breath.   Gastrointestinal: See HPI. Negative for nausea/vomiting. Negative for abdominal pain.  Genitourinary: See HPI. Negative for discoloration or dysuria.  Skin: Negative for dry skin,  "itching and rash.   Hematologic/Lymphatic: negative for bleeding/clotting disorders.   Musculoskeletal: Negative for falls and muscle weakness.   Neurological: See HPI. no history of seizures. no history of cranial surgery or shunts.  Endocrine: Negative for polydipsia, polyphagia and polyuria.   Allergic/Immunologic: Negative for hives and persistent infections.    PHYSICAL EXAMINATION:    Ht 5' 10" (1.778 m)   Wt 84 kg (185 lb 3.2 oz)   BMI 26.57 kg/m²     General: The patient is a 33 y.o. male in no apparent distress, the patient is orientatied to person, place and time.   Psych: Normal mood and affect  HEENT: Vision grossly intact, hearing intact to the spoken word.  Lungs: Respirations unlabored.  Gait: Normal station and gait, no difficulty with toe or heel walk.   Skin: Dorsal lumbar skin negative for rashes, lesions, hairy patches and surgical scars.  Range of motion: Lumbar range of motion is acceptable. There is no lumbar tenderness to palpation.  Spinal Balance: Global saggital and coronal spinal balance acceptable, no significant for scoliosis and kyphosis.  Musculoskeletal: No pain with the range of motion of the bilateral hips. No trochanteric tenderness to palpation.  Vascular: Bilateral lower extremities warm and well perfused, Dorsalis pedis pulses 2+ bilaterally.  Neurological: Normal strength and tone in all major motor groups in the bilateral lower extremities. Normal sensation to light touch in the L2-S1 dermatomes bilaterally.  Deep tendon reflexes symmetric 2+ in the bilateral lower extremities.  Negative Babinski bilaterally.    IMAGING:   Today I independently reviewed the following images and my interpretations are as follows:    AP, Lat and Flex/Ex upright L-spine films demonstrate L2 VCF.     Body mass index is 26.57 kg/m².  No results found for: HGBA1C      ASSESSMENT/PLAN:    Priti was seen today for low-back pain.    Diagnoses and all orders for this visit:    Closed compression " fracture of L2 lumbar vertebra with routine healing, subsequent encounter  -     Ambulatory referral/consult to Physical/Occupational Therapy; Future      Follow up if symptoms worsen or fail to improve.    Patient has L2 VCF. I discussed the natural history of their diagnoses as well as surgical and nonsurgical treatment options. I educated the patient on the importance of core/back strengthening, correct posture, bending/lifting ergonomics, and low-impact aerobic exercises (walking, elliptical, and aquatherapy). Continue medications OTC. I will refer the patient to PT for core/back strengthening. Patient will follow up PRN. Next step is a lumbar MRI.    Yoan Lugo MD  Orthopaedic Spine Surgeon  Department of Orthopaedic Surgery  [phone]

## 2023-07-18 ENCOUNTER — CLINICAL SUPPORT (OUTPATIENT)
Dept: REHABILITATION | Facility: OTHER | Age: 33
End: 2023-07-18
Payer: MEDICAID

## 2023-07-18 DIAGNOSIS — S32.020D CLOSED COMPRESSION FRACTURE OF L2 LUMBAR VERTEBRA WITH ROUTINE HEALING, SUBSEQUENT ENCOUNTER: ICD-10-CM

## 2023-07-18 PROCEDURE — 97161 PT EVAL LOW COMPLEX 20 MIN: CPT | Mod: PN

## 2023-07-18 PROCEDURE — 97110 THERAPEUTIC EXERCISES: CPT | Mod: PN

## 2023-07-18 NOTE — PROGRESS NOTES
[facility]  Physical Therapy Initial Evaluation    Name: Priti Ernandez  Clinic Number: [id number]    Therapy Diagnosis:   Encounter Diagnosis   Name Primary?    Closed compression fracture of L2 lumbar vertebra with routine healing, subsequent encounter      Physician: Yoan Lugo MD    Physician Orders: Physical Therapy Evaluate and Treat  Medical Diagnosis from Referral: Closed compression fx of L2 vertebra with routine healing  Evaluation Date: 7/18/23  Authorization Period Expiration: 6/7/24  Plan of Care Expiration: 7/18/2023 to 10/18/23  Visit # / Visits authorized: 1/1 (pending additional authorization following initial evaluation)   FOTO: 1/3  on 7/18/23      Time In: 1115a  Time Out: 1200p  Total Billable Time: 45 minutes    Precautions: standard    Subjective     History of current condition - Priti reports: 3/18/23 L2 compression fx fell down off a waterfall in [facility]    Difficulty sitting devan on the floor    PM hours are difficulty with achiness     Medical History:   No past medical history on file.    Surgical History:   Priti Ernandez  has a past surgical history that includes Knee surgery.    Medications:   Priti has a current medication list which includes the following prescription(s): celecoxib.    Allergies:   Review of patient's allergies indicates:  No Known Allergies     Imaging: Moderate compression L2 again noted.  No subluxation with flexion extension.    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    Objective     WNL=within normal limits  WFL=within functional limits  NT=not tested  *=pain    Posture: WNL  Palpation: tenderness to palpation at L2-3 SP with PA mobility assessment  Sensation: intact  Deep tendon reflexes: NT    Lumbar Active range of motion  Pain/dysfunction with movement:   Flexion 75 Lumbar spine tightness   Extension 100    Right side bending 90 Left side tightness   Left side bending 100    Right rotation 100    Left rotation 100          Right LE   Left LE      Iliopsoas:  L2 5/5 Iliopsoas: L2 5/5    Quadriceps:  L3 - femoral nerve 5/5 Quadriceps: L3 - femoral nerve 5/5    Hip adduction:  L3 - obterator 5/5 Hip adduction: L3 - obterator 5/5    Hamstrings:  S2 5/5 Hamstrings: S2 5/5    Ankle DF/EV:  L4 5/5 Ankle DF/EV: L4 5/5    GT Ext:  L5 5/5 GT Ext L5 5/5    Hip Abduction:  L5-S1 - Superior gluteal nerve 5/5  Hip Abduction: L5-S1 - Superior gluteal nerve 5/5    Hip extension:  L5-S2 - Inferior gluteal nerve 5/5 Hip extension: L5-S2 - Inferior gluteal nerve 5/5    Ankle PF:   S1 - tibial nerve NT Ankle PF S1 - tibial nerve NT         Slump R: negative  Slump L:  negative    SLR R: negative  SLR L: negative      Joint mobility:   Thoracic: hypomobility with segmental PA mobility testing  Lumbar: hypomobility with segmental PA mobility testing  Other: decreased right side hip IR ROM      Gait analysis: WNL      CMS Impairment/Limitation/Restriction for FOTO Survey    Therapist reviewed FOTO scores for Priti Ernandez on 7/18/2023.   FOTO documents entered into EPIC - see Media section.    Limitation Score: 44%  Predicted Goal: 28%    Category: Mobility     TREATMENT     Treatment Time In: 1145a  Treatment Time Out: 1155a  Total Treatment time separate from Evaluation: 10 minutes    Therapeutic Exercises were provided for 10 minutes to improve strength and AROM including:    Pelvic tilt x10  Pelvic tilt clocks 1 lap  SKC x30"  DKC x30"  Supine hamstring stretch with strap x30"  Prone " "quadriceps stretch x30"  Hemal pose x30"  [place] pose x30"      Home Exercises and Patient Education Provided:    Education provided:   - Findings; prognosis and plan of care (POC)  - Home exercise program (HEP)  - Modality options  - Therapist contact information    Written Home Exercises Provided: Yes  Exercises were reviewed and Priti was able to demonstrate them prior to the end of the session.  Priti demonstrated good understanding of the education provided.       Assessment     Priti is a 33 y.o. male referred to outpatient Physical Therapy with a medical diagnosis of L2 compression fx. Pt presents to PT with pain, decreased lumbar ROM, decreased strength and flexibility, poor posture, and functional deficits with sitting. These deficits are negatively impacting this patient's ability to complete their work duties and activities of daily living.     Pt prognosis is Excellent.   Pt will benefit from skilled outpatient Physical Therapy to address the deficits stated above and in the chart below, provide pt/family education, and to maximize pt's level of independence.     Plan of care discussed with patient: Yes  Pt's spiritual, cultural and educational needs considered and pt agreeable to plan of care and goals as stated below:     Anticipated Barriers for therapy: None      Medical Necessity is demonstrated by the following  History  Co-morbidities and personal factors that may impact the plan of care Co-morbidities:   NA    Personal Factors:   no deficits     low   Examination  Body Structures and Functions, activity limitations and participation restrictions that may impact the plan of care Body Regions:   back    Body Systems:    ROM  strength  motor control    Participation Restrictions:   Walking    Activity limitations:   Learning and applying knowledge  no deficits    General Tasks and Commands  No Deficits    Communication  No Deficits    Mobility  lifting and carrying objects    Self care  no " deficits    Domestic Life  No Deficits    Interactions/Relationships  No Deficits    Life Areas  No Deficits    Community and Social Life  No Deficits         low   Clinical Presentation stable and uncomplicated low   Decision Making/ Complexity Score: low     GOALS:  Short Term Goals (4 Weeks):  1. Patient will be compliant with home exercise program to supplement therapy in promoting functional mobility. (progressing, not met)    2. Patient will perform trunk flexion with good control to demonstrate improved core strength. (progressing, not met)    3. Patient will report no pain during thoracolumbar active range of motion to promote functional mobility.  (progressing, not met)          Long Term Goals (6 Weeks):   1. Patient will improve FOTO score to </= 28% limited to decrease perceived limitation with maintaining/changing body position. (progressing, not met)    2. Patient will perform sitting on floor with good control to demonstrate improved core strength.  (progressing, not met)    3. Patient will tolerate gym workouts with no increase in low back pain to return to PLOF.  (progressing, not met)          Plan     Plan of care Certification: 7/18/2023 to 10/18/23    Outpatient Physical Therapy 1 times weekly for 12 weeks to include the following interventions: Therapeutic Exercises, Manual Therapeutic Technique, Neuromuscular Re Education, Therapeutic Activities. Modalities, Kinesiotape prn, and Functional Dry Needling as needed.    Yobani Quan, PT,  DPT, OCS

## 2023-11-14 DIAGNOSIS — S32.020D CLOSED COMPRESSION FRACTURE OF L2 LUMBAR VERTEBRA WITH ROUTINE HEALING, SUBSEQUENT ENCOUNTER: Primary | ICD-10-CM

## 2023-11-14 DIAGNOSIS — S32.020D CLOSED COMPRESSION FRACTURE OF L2 LUMBAR VERTEBRA WITH ROUTINE HEALING, SUBSEQUENT ENCOUNTER: ICD-10-CM

## 2023-11-14 DIAGNOSIS — M51.36 DDD (DEGENERATIVE DISC DISEASE), LUMBAR: Primary | ICD-10-CM

## 2023-11-14 NOTE — PROGRESS NOTES
Spoke with pt virtually. Pt was last seen 6/8/23 and continues to have low back pain secondary to L2 compression fx. Pt has tried home exercises and NSAIDs for 8 weeks with worsening of pain. Pain is 8/10. I provided the patient with a home exercise program. It is the [employer] spine conditioning program. Exercises include head rolls, kneeling back extension, sitting rotation stretch, modified seated side straddle, knee to chest, bird dog, plank, modified seated plank, hip bridges, abdominal bracing, and abdominal crunch. Pt completed each exercise daily for one hour with worsening of pain. Will obtain MRI to further evaluate and call with results.